# Patient Record
Sex: FEMALE | Race: WHITE
[De-identification: names, ages, dates, MRNs, and addresses within clinical notes are randomized per-mention and may not be internally consistent; named-entity substitution may affect disease eponyms.]

---

## 2018-11-26 ENCOUNTER — HOSPITAL ENCOUNTER (OUTPATIENT)
Dept: HOSPITAL 62 - END | Age: 53
Discharge: HOME | End: 2018-11-26
Attending: INTERNAL MEDICINE
Payer: COMMERCIAL

## 2018-11-26 VITALS — SYSTOLIC BLOOD PRESSURE: 110 MMHG | DIASTOLIC BLOOD PRESSURE: 75 MMHG

## 2018-11-26 DIAGNOSIS — Z79.899: ICD-10-CM

## 2018-11-26 DIAGNOSIS — Z87.891: ICD-10-CM

## 2018-11-26 DIAGNOSIS — K64.8: ICD-10-CM

## 2018-11-26 DIAGNOSIS — Z12.11: Primary | ICD-10-CM

## 2018-11-26 DIAGNOSIS — J45.909: ICD-10-CM

## 2018-11-26 DIAGNOSIS — K52.9: ICD-10-CM

## 2018-11-26 DIAGNOSIS — D12.0: ICD-10-CM

## 2018-11-26 DIAGNOSIS — Z88.1: ICD-10-CM

## 2018-11-26 PROCEDURE — 45380 COLONOSCOPY AND BIOPSY: CPT

## 2018-11-26 PROCEDURE — 88305 TISSUE EXAM BY PATHOLOGIST: CPT

## 2018-11-26 NOTE — OPERATIVE REPORT
Operative Report


DATE OF SURGERY: 11/26/18


Operative Report: 





The risks, benefits and alternatives of the procedure including the risk of 

bleeding, perforation requiring surgery are explained to the patient in detail 

and informed consent is obtained.  Patient is brought back to the endoscopy 

suite and placed in the left, lateral decubital position.  Timeout was called.  

Propofol medication is administered.  Rectal examination is done which did not 

reveal any masses, tears or fissures.  An Olympus videoscope was introduced 

into the patient's rectum.  The scope was then carefully advanced all the way 

to the cecum.  The cecum was identified by the usual anatomical landmarks 

including the ileocecal valve as well as the appendiceal office.  

Photodocumentation was obtained.  The scope was then sequentially pulled back 

via the various segments of the colon including the ascending colon, hepatic 

flexure, transverse colon, splenic flexure, descending colon and finally into 

the rectosigmoid portions of the colon.  Retroflexion maneuver is performed.


PREOPERATIVE DIAGNOSIS: Colorectal cancer screening.


POSTOPERATIVE DIAGNOSIS: Cecal polyp that was removed via biopsy forceps.  

Internal hemorrhoids


OPERATION: Colonoscopy with biopsy


SURGEON: ADIN REDDY


ANESTHESIA: LMAC


TISSUE REMOVED OR ALTERED: As noted above


COMPLICATIONS: 





None.


ESTIMATED BLOOD LOSS: None.


INTRAOPERATIVE FINDINGS: As noted above.


PROCEDURE: 





Patient tolerated the procedure well.


No immediate postprocedure complications are noted.


Patient discharged in good condition.


Discharge date 11/26/2018.


Discharge diet: Regular.


Discharge activity: Regular.


2-3-week follow-up to discuss findings.


We will wait on the pathology.


5-year surveillance colonoscopy.


Patient is instructed to call the office or proceed to the emergency room 

should there be any further problems or questions.